# Patient Record
Sex: FEMALE | Race: WHITE | NOT HISPANIC OR LATINO | URBAN - METROPOLITAN AREA
[De-identification: names, ages, dates, MRNs, and addresses within clinical notes are randomized per-mention and may not be internally consistent; named-entity substitution may affect disease eponyms.]

---

## 2017-05-03 ENCOUNTER — IMPORTED ENCOUNTER (OUTPATIENT)
Dept: URBAN - METROPOLITAN AREA CLINIC 50 | Facility: CLINIC | Age: 77
End: 2017-05-03

## 2017-05-23 ENCOUNTER — IMPORTED ENCOUNTER (OUTPATIENT)
Dept: URBAN - METROPOLITAN AREA CLINIC 50 | Facility: CLINIC | Age: 77
End: 2017-05-23

## 2017-08-22 ENCOUNTER — IMPORTED ENCOUNTER (OUTPATIENT)
Dept: URBAN - METROPOLITAN AREA CLINIC 50 | Facility: CLINIC | Age: 77
End: 2017-08-22

## 2017-11-08 ENCOUNTER — IMPORTED ENCOUNTER (OUTPATIENT)
Dept: URBAN - METROPOLITAN AREA CLINIC 50 | Facility: CLINIC | Age: 77
End: 2017-11-08

## 2017-11-14 ENCOUNTER — IMPORTED ENCOUNTER (OUTPATIENT)
Dept: URBAN - METROPOLITAN AREA CLINIC 50 | Facility: CLINIC | Age: 77
End: 2017-11-14

## 2017-11-14 PROBLEM — Z98.890: Noted: 2017-11-14

## 2017-11-21 ENCOUNTER — IMPORTED ENCOUNTER (OUTPATIENT)
Dept: URBAN - METROPOLITAN AREA CLINIC 50 | Facility: CLINIC | Age: 77
End: 2017-11-21

## 2017-12-28 ENCOUNTER — IMPORTED ENCOUNTER (OUTPATIENT)
Dept: URBAN - METROPOLITAN AREA CLINIC 50 | Facility: CLINIC | Age: 77
End: 2017-12-28

## 2018-01-30 ENCOUNTER — IMPORTED ENCOUNTER (OUTPATIENT)
Dept: URBAN - METROPOLITAN AREA CLINIC 50 | Facility: CLINIC | Age: 78
End: 2018-01-30

## 2018-08-07 ENCOUNTER — IMPORTED ENCOUNTER (OUTPATIENT)
Dept: URBAN - METROPOLITAN AREA CLINIC 50 | Facility: CLINIC | Age: 78
End: 2018-08-07

## 2018-10-08 ENCOUNTER — IMPORTED ENCOUNTER (OUTPATIENT)
Dept: URBAN - METROPOLITAN AREA CLINIC 50 | Facility: CLINIC | Age: 78
End: 2018-10-08

## 2019-02-14 ENCOUNTER — IMPORTED ENCOUNTER (OUTPATIENT)
Dept: URBAN - METROPOLITAN AREA CLINIC 50 | Facility: CLINIC | Age: 79
End: 2019-02-14

## 2019-04-09 ENCOUNTER — IMPORTED ENCOUNTER (OUTPATIENT)
Dept: URBAN - METROPOLITAN AREA CLINIC 50 | Facility: CLINIC | Age: 79
End: 2019-04-09

## 2019-08-29 ENCOUNTER — IMPORTED ENCOUNTER (OUTPATIENT)
Dept: URBAN - METROPOLITAN AREA CLINIC 50 | Facility: CLINIC | Age: 79
End: 2019-08-29

## 2020-05-11 ENCOUNTER — IMPORTED ENCOUNTER (OUTPATIENT)
Dept: URBAN - METROPOLITAN AREA CLINIC 50 | Facility: CLINIC | Age: 80
End: 2020-05-11

## 2020-08-12 ENCOUNTER — IMPORTED ENCOUNTER (OUTPATIENT)
Dept: URBAN - METROPOLITAN AREA CLINIC 50 | Facility: CLINIC | Age: 80
End: 2020-08-12

## 2020-09-08 ENCOUNTER — IMPORTED ENCOUNTER (OUTPATIENT)
Dept: URBAN - METROPOLITAN AREA CLINIC 50 | Facility: CLINIC | Age: 80
End: 2020-09-08

## 2020-09-08 NOTE — PATIENT DISCUSSION
"""IOP stable with current drop therapy at this time.  Will continue to monitor."" ""Continue Latanoprost both eyes at bedtime ."""

## 2020-09-30 ENCOUNTER — IMPORTED ENCOUNTER (OUTPATIENT)
Dept: URBAN - METROPOLITAN AREA CLINIC 50 | Facility: CLINIC | Age: 80
End: 2020-09-30

## 2021-03-09 ENCOUNTER — IMPORTED ENCOUNTER (OUTPATIENT)
Dept: URBAN - METROPOLITAN AREA CLINIC 50 | Facility: CLINIC | Age: 81
End: 2021-03-09

## 2021-03-09 NOTE — PATIENT DISCUSSION
"""IOP stable on current drop therapy.  WIll continue to monitor."" ""Continue Latanoprost both eyes at bedtime ."""

## 2021-04-18 ASSESSMENT — TONOMETRY
OS_IOP_MMHG: 13
OD_IOP_MMHG: 16
OS_IOP_MMHG: 14
OS_IOP_MMHG: 12
OS_IOP_MMHG: 15
OD_IOP_MMHG: 14
OD_IOP_MMHG: 15
OD_IOP_MMHG: 13
OD_IOP_MMHG: 15
OD_IOP_MMHG: 11
OS_IOP_MMHG: 15
OD_IOP_MMHG: 15
OD_IOP_MMHG: 14
OS_IOP_MMHG: 16
OD_IOP_MMHG: 13
OS_IOP_MMHG: 13
OD_IOP_MMHG: 12
OS_IOP_MMHG: 14
OS_IOP_MMHG: 12
OD_IOP_MMHG: 16
OS_IOP_MMHG: 13
OD_IOP_MMHG: 13
OD_IOP_MMHG: 12
OS_IOP_MMHG: 16
OD_IOP_MMHG: 16
OS_IOP_MMHG: 16
OS_IOP_MMHG: 13
OS_IOP_MMHG: 17

## 2021-04-18 ASSESSMENT — VISUAL ACUITY
OS_CC: 20/20
OS_PH: 20/70
OD_CC: 20/25
OD_CC: 20/20
OS_CC: 20/25
OS_SC: 20/400
OD_SC: 20/20
OD_CC: 20/20
OD_SC: 20/20
OS_OTHER: >20/400.
OD_BAT: 20/80
OD_CC: 20/25
OD_SC: 20/20-3
OS_CC: J2NEAR
OD_CC: J1+@ 14 IN
OD_SC: 20/20
OS_CC: J2+
OD_OTHER: 20/80. >20/400.
OS_CC: J1+@ 16 IN
OS_CC: J1
OS_PH: 20/50-2
OS_BAT: >20/400
OD_CC: J2+
OD_CC: J2NEAR
OS_SC: 20/20
OS_SC: 20/100+
OS_SC: 20/400
OS_SC: 20/400
OS_CC: 20/20
OS_CC: 20/40
OD_CC: J1+@ 16 IN
OD_CC: J1
OD_SC: 20/30
OS_CC: J1+@ 14 IN

## 2021-04-18 ASSESSMENT — PACHYMETRY
OS_CT_UM: 531
OD_CT_UM: 530
OS_CT_UM: 531
OS_CT_UM: 531
OD_CT_UM: 530
OS_CT_UM: 531
OD_CT_UM: 530
OD_CT_UM: 530
OS_CT_UM: 531
OD_CT_UM: 530
OD_CT_UM: 510
OS_CT_UM: 521
OS_CT_UM: 531
OD_CT_UM: 530
OS_CT_UM: 531
OS_CT_UM: 521
OD_CT_UM: 530
OD_CT_UM: 530
OD_CT_UM: 510
OS_CT_UM: 531
OD_CT_UM: 530
OS_CT_UM: 521
OS_CT_UM: 531
OD_CT_UM: 530
OD_CT_UM: 530
OD_CT_UM: 510

## 2021-09-02 ENCOUNTER — PREPPED CHART (OUTPATIENT)
Dept: URBAN - METROPOLITAN AREA CLINIC 52 | Facility: CLINIC | Age: 81
End: 2021-09-02

## 2021-09-02 NOTE — PATIENT DISCUSSION
"""IOP stable on current drop therapy.  WIll continue to monitor."" ""Continue Latanoprost both eyes at bedtime .""."

## 2021-09-03 ENCOUNTER — DIAGNOSTICS - HVF/OCT (OUTPATIENT)
Dept: URBAN - METROPOLITAN AREA CLINIC 52 | Facility: CLINIC | Age: 81
End: 2021-09-03

## 2021-09-03 DIAGNOSIS — H40.013: ICD-10-CM

## 2021-09-03 PROCEDURE — 92083 EXTENDED VISUAL FIELD XM: CPT

## 2021-09-03 PROCEDURE — 92133 CPTRZD OPH DX IMG PST SGM ON: CPT

## 2021-09-08 ENCOUNTER — DIAGNOSTIC TESTING ONLY (OUTPATIENT)
Dept: URBAN - METROPOLITAN AREA CLINIC 52 | Facility: CLINIC | Age: 81
End: 2021-09-08

## 2021-09-08 DIAGNOSIS — H47.233: ICD-10-CM

## 2021-09-08 PROCEDURE — 92273 FULL FIELD ERG W/I&R: CPT

## 2021-09-28 ENCOUNTER — COMPREHENSIVE EXAM (OUTPATIENT)
Dept: URBAN - METROPOLITAN AREA CLINIC 52 | Facility: CLINIC | Age: 81
End: 2021-09-28

## 2021-09-28 DIAGNOSIS — E11.9: ICD-10-CM

## 2021-09-28 DIAGNOSIS — H47.233: ICD-10-CM

## 2021-09-28 DIAGNOSIS — H35.3131: ICD-10-CM

## 2021-09-28 DIAGNOSIS — H40.013: ICD-10-CM

## 2021-09-28 PROCEDURE — 92014 COMPRE OPH EXAM EST PT 1/>: CPT

## 2021-09-28 ASSESSMENT — TONOMETRY
OS_IOP_MMHG: 13
OD_IOP_MMHG: 13
OD_IOP_MMHG: 14
OS_IOP_MMHG: 14

## 2021-09-28 ASSESSMENT — VISUAL ACUITY
OD_SC: 20/20-1
OS_PH: 20/40
OS_SC: 20/200
OU_SC: 20/20
OU_SC: J1+
OS_SC: J1+
OD_SC: J2

## 2021-09-28 NOTE — PATIENT DISCUSSION
Discussed with patient that they have mild dry macular degeneration. Presently no treatment. Continue on recommended vitamin therapy but AREDS not presently recommended for mild AMD. Remember to wear sunglasses and avoid smoking. If significant changes noted on grid patient should contact our office to be reexamined.

## 2021-09-28 NOTE — PATIENT DISCUSSION
IOP stable on current drop therapy. Patient to continue Latanoprost QHS OU. Due to history of stability, will monitor patient annually with testing.

## 2022-09-06 ENCOUNTER — COMPREHENSIVE EXAM (OUTPATIENT)
Dept: URBAN - METROPOLITAN AREA CLINIC 52 | Facility: CLINIC | Age: 82
End: 2022-09-06

## 2022-09-06 DIAGNOSIS — H47.233: ICD-10-CM

## 2022-09-06 DIAGNOSIS — H40.013: ICD-10-CM

## 2022-09-06 DIAGNOSIS — E11.9: ICD-10-CM

## 2022-09-06 DIAGNOSIS — H43.811: ICD-10-CM

## 2022-09-06 DIAGNOSIS — H35.3131: ICD-10-CM

## 2022-09-06 PROCEDURE — 92014 COMPRE OPH EXAM EST PT 1/>: CPT

## 2022-09-06 PROCEDURE — 92083 EXTENDED VISUAL FIELD XM: CPT

## 2022-09-06 PROCEDURE — 92133 CPTRZD OPH DX IMG PST SGM ON: CPT

## 2022-09-06 NOTE — PATIENT DISCUSSION
Reviewed testing with patient suspect that we will need to begin drop therapy on the left eye. Will wait until next set of tests are done to decide.

## 2023-09-20 ENCOUNTER — COMPREHENSIVE EXAM (OUTPATIENT)
Dept: URBAN - METROPOLITAN AREA CLINIC 52 | Facility: CLINIC | Age: 83
End: 2023-09-20

## 2023-09-20 DIAGNOSIS — E11.9: ICD-10-CM

## 2023-09-20 DIAGNOSIS — H40.1131: ICD-10-CM

## 2023-09-20 PROCEDURE — 92133 CPTRZD OPH DX IMG PST SGM ON: CPT

## 2023-09-20 PROCEDURE — 92083 EXTENDED VISUAL FIELD XM: CPT

## 2023-09-20 PROCEDURE — 92014 COMPRE OPH EXAM EST PT 1/>: CPT

## 2023-09-20 ASSESSMENT — TONOMETRY
OS_IOP_MMHG: 16
OS_IOP_MMHG: 19
OD_IOP_MMHG: 14
OD_IOP_MMHG: 13
OS_IOP_MMHG: 18
OD_IOP_MMHG: 12

## 2023-09-20 ASSESSMENT — VISUAL ACUITY
OS_SC: 20/40
OS_PH: 20/20
OU_SC: J1+
OD_SC: 20/20

## 2024-03-25 ENCOUNTER — ESTABLISHED PATIENT (OUTPATIENT)
Dept: URBAN - METROPOLITAN AREA CLINIC 52 | Facility: CLINIC | Age: 84
End: 2024-03-25

## 2024-03-25 DIAGNOSIS — H53.002: ICD-10-CM

## 2024-03-25 DIAGNOSIS — H35.3132: ICD-10-CM

## 2024-03-25 DIAGNOSIS — H02.834: ICD-10-CM

## 2024-03-25 DIAGNOSIS — H40.1131: ICD-10-CM

## 2024-03-25 DIAGNOSIS — H43.811: ICD-10-CM

## 2024-03-25 DIAGNOSIS — E11.9: ICD-10-CM

## 2024-03-25 DIAGNOSIS — H43.22: ICD-10-CM

## 2024-03-25 DIAGNOSIS — H35.723: ICD-10-CM

## 2024-03-25 DIAGNOSIS — H35.373: ICD-10-CM

## 2024-03-25 DIAGNOSIS — H02.831: ICD-10-CM

## 2024-03-25 PROCEDURE — 99214 OFFICE O/P EST MOD 30 MIN: CPT

## 2024-03-25 PROCEDURE — 92134 CPTRZ OPH DX IMG PST SGM RTA: CPT

## 2024-03-25 ASSESSMENT — VISUAL ACUITY
OU_SC: 20/20
OD_SC: 20/20
OS_SC: 20/200
OS_PH: 20/50-1

## 2024-03-25 ASSESSMENT — TONOMETRY
OS_IOP_MMHG: 17
OD_IOP_MMHG: 17
OS_IOP_MMHG: 18
OD_IOP_MMHG: 16